# Patient Record
Sex: FEMALE | Race: OTHER | URBAN - METROPOLITAN AREA
[De-identification: names, ages, dates, MRNs, and addresses within clinical notes are randomized per-mention and may not be internally consistent; named-entity substitution may affect disease eponyms.]

---

## 2019-03-15 ENCOUNTER — OFFICE VISIT (OUTPATIENT)
Dept: FAMILY MEDICINE CLINIC | Age: 29
End: 2019-03-15

## 2019-03-15 VITALS
SYSTOLIC BLOOD PRESSURE: 121 MMHG | BODY MASS INDEX: 29.81 KG/M2 | DIASTOLIC BLOOD PRESSURE: 64 MMHG | TEMPERATURE: 98.3 F | HEIGHT: 62 IN | HEART RATE: 61 BPM | WEIGHT: 162 LBS
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
## 2019-03-15 RX ORDER — HYDROXYZINE 25 MG/1
25 TABLET, FILM COATED ORAL
Qty: 30 TAB | Refills: 1
Start: 2019-03-15 | End: 2019-03-25

## 2019-03-15 NOTE — PATIENT INSTRUCTIONS
Chinches: Instrucciones de cuidado - [ Bedbugs: Care Instructions ]  Instrucciones de [address] chinches son insectos diminutos que se alimentan de la jeremy de [place] y personas. Suelen esconderse en la ropa de cama y en los colchones. No se melgar determinado que las chinches transmitan enfermedades a las personas. Maris la comezón de las picaduras puede ser tan molesta que alguien puede rascarse hasta el punto de lastimarse la piel. [place] puede causar [place]. Las picaduras también pueden provocarles madeleine reacción alérgica a algunas personas. Estos insectos pueden pasar a esconderse en las grietas y fisuras de la habitación y poner huevos en cualquier objeto que se encuentre en la habitación, yvonne en la ropa y los muebles. [place] hace que eliminarlos sea muy difícil. Cómo eliminar las chinches  La mejor manera de eliminar las chinches es llamar a madeleine empresa especializada en el control de plagas. Estas empresas utilizan pesticidas y otro tipo de equipos para [facility] insectos y [place]. Si decide comprar lawrence propio pesticida, revise la etiqueta. Asegúrese de que indica que sirve para las chinches. Asegúrese de seguir cuidadosamente las instrucciones de la etiqueta. [place] tenga que usar el pesticida más de [place] otras medidas de limpieza yvonne:  · Pasar la aspiradora a menudo. Asegúrese de vaciar la aspiradora después de cada uso. [place] madeleine bolsa de [facility] y arrójela en un recipiente de basura al [employer] exterior. Si no [place] madeleine bolsa de [place], [place] el recipiente y lávelo con [place] y [place]. · Randy cosas que pudieran esconder insectos. Vangie Catena y luego secar artículos en madeleine secadora con madeleine selección de aire caliente es adecuado para destruir chinches en las prendas de vestir o la ropa de Suzanna. Ajuste la secadora en la selección más caliente que pueda soportar la ledy.   · Usar protectores de colchón, somier (\"box spring\") y almohadas (fundas) para atrapar chinches y ayudar a deshacerse de ellas. Asegúrese de seguir las indicaciones del paquete. Después de que haya eliminado las chinches de lawrence colchón, puede comprar un protector especial hecho para evitar que las chinches accedan al colchón. La atención de seguimiento es madeleine parte clave de lawrence tratamiento y seguridad. Asegúrese de hacer y acudir a todas las citas, y llame a lawrence médico si está teniendo problemas. También es madeleine buena idea saber los resultados de sunny exámenes y mantener madeleine lista de los medicamentos que quin. ¿Cómo puede cuidarse en el hogar? · Lávese las picaduras con jabón para reducir la probabilidad de infección. [place] de no rascarse. · Use loción de calamina o madeleine crema para aliviar la comezón. También puede aplicarse madeleine toalla pequeña embebida en charlene sobre la adonis que le pica por 15 minutos. Puede comprar harina de charlene, yvonne [employer] Oat[employer], en las [place]. · Use madeleine compresa de hielo para detener la hinchazón. ¿Cuándo debe pedir ayuda? Llame a lawrence médico ahora mismo o busque atención médica inmediata si:    · Tiene señales de infección, tales yvonne:  ? Aumento del dolor, la hinchazón, la temperatura o el enrojecimiento. ? Vetas rojizas que salen de la adonis de la picadura. ? Pus que sale de la adonis de la picadura. ? [place] especial atención a los cambios en lawrence jake, y asegúrese de comunicarse con lawrence médico si:    · Otras personas de lawrence yana tienen comezón.     · No mejora yvonne se esperaba. ¿Dónde puede encontrar más información en inglés? Dave Litter a [email or web address]-gustavo.[email or web address] Aroldo Fallow [address] en la búsqueda para aprender más acerca de \"Chinches: Instrucciones de cuidado - [ Bedbugs: Care Instructions ]. \"  Revisado: 23 septiembre, 2018  Versión del contenido: 11.9  © 5473-2581 [employer].  Las instrucciones de cuidado fueron adaptadas bajo licencia por [employer] (which disclaims liability or warranty for this information). Si usted tiene [place] afección médica o sobre estas instrucciones, siempre pregunte a lawrence profesional de jake. [employer] niega toda garantía o responsabilidad por lawrence uso de esta información.

## 2019-03-15 NOTE — PROGRESS NOTES
Reviewed AVS, prescription and pharmacy location with patient. AVS printed and given, patient states that she has the printed prescription for today's medication. .This has been fully explained to the patient, who indicates understanding and agrees with plan. No further questions at this time.  Marlon Whyte RN

## 2019-03-15 NOTE — PROGRESS NOTES
Subjective:     Chief Complaint   Patient presents with    Rash     x3 days. upper back, legs and arms     she is a 33y.o. year old female who presents for evalution. Itchy rash for the last 4 days. Got a new mattress a few weeks ago, not sure where it came from or whether it was new. .  Daughter sleeps with her, has 1 small area of eruption on her arm. H/o asthma as a child, no other pmh, on no meds. Lives in  apt. Objective:     Vitals:    03/15/19 0944   BP: 121/64   Pulse: 61   Temp: 98.3 °F (36.8 °C)   TempSrc: Oral   Weight: 162 lb (73.5 kg)   Height: 5' 2.21\" (1.58 m)       Physical Examination: General appearance - alert, well appearing, and in no distress  Skin - normal coloration and turgor, no rashes, no suspicious skin lesions noted  Row of 3 <1cm round red lesions on right wrist, with erythem area nearer the thumb that she is scratching. Several similar lesions left upper arm in a row. Small area of papuar erythem lesions on back. Assessment/ Plan:     Sx secondary to bedbugs. Wrote letter to give landlord for eval/tx of apt. Info on laundering bedding. Hydroxyzine for pruritis. No results found for any visits on 03/15/19. Orders Placed This Encounter    hydrOXYzine HCl (ATARAX) 25 mg tablet     Sig: Take 1 Tab by mouth three (3) times daily as needed for Itching for up to 10 days.      Dispense:  30 Tab     Refill:  1           Follow-up Disposition: Not on File